# Patient Record
(demographics unavailable — no encounter records)

---

## 2024-12-26 NOTE — HISTORY OF PRESENT ILLNESS
[FreeTextEntry1] :  23 year old healthy woman Landed at [facility] from [place] 2 hours ago Had eaten food from a vendor in [place] at the airport and about 2 hours later had nausea and vomiting, then diarrhea No fever No BRBPR Over long flight had multiple episodes of emesis and diarrhea Feels weak and dizzy (+) chills and body aches

## 2024-12-26 NOTE — ASSESSMENT
[FreeTextEntry1] :  Gastroenteritis/ Food poisoning  Now feels weak and dizzy Will support with IV hydration Zofran 8 mg IV Rest

## 2025-07-08 NOTE — HISTORY OF PRESENT ILLNESS
[FreeTextEntry1] : NPA - Infected pimple [de-identified] : Pradeep Bee 25 y/o F presents for infected pimple on chin  - 3 days  - Painful -tried squeezing    Personal history of skin cancer: No Family history of skin cancer: Paternal grandmother  History of blistering sunburns: No History of tanning bed use: no  Uses sunscreen regularly: yes    Chronic illness

## 2025-07-08 NOTE — ASSESSMENT
[FreeTextEntry1] : #Impetiginized acne  -start doxy 100mg bid x 2 weeks, sed -start dilute AA soak f/b mupirocin ointment TID x 1-2 weeks until resolved -do not squeeze or pick -pt instructed to contact clinic if no sig improvement after 3 days   rtc prn